# Patient Record
(demographics unavailable — no encounter records)

---

## 2025-04-22 NOTE — DISCUSSION/SUMMARY
[FreeTextEntry1] : TIME SPENT EXPLAINING TO MOM WHY HE SEEMS TO BE GETTING FREQ EAR INFECTIONS AND NASAL CONGESTION.( LATTER LEADS TO FORMER ). WE CAN TRY TO 10 USE DYMISTA TWICE A DAY  2) CLARITIN 5 ml QHS  3 ) CONSUME A DIET RICH IN VEGGIES AND PROTEIN AND NOT SO IN DAIRY PRODUCTS 4) AS MOM WANTED SOME RECOMMENDATIONS FOR NATURAL ALTERNATIVES WILL SUGGEST SHE GIVE HIM d hIST jR BY [employer] 5) ANTIBIOTICS IF HE IS FEBRILE OR CRYING WITH EAR PAIN AGAIN , OTHERWISE NOT BUT A FOLLOW UP IS A MUST TIME SPENT IN COUNSELLING AND COORDINATING CARE WAS 45 MINUTES

## 2025-04-22 NOTE — COUNSELING
[Use of Plain Language] : use of plain language [Needs Reinforcement, Provided] : needs reinforcement, provided [None] : none [] : I have reviewed management goals with caretaker and provided a copy of care plan

## 2025-04-22 NOTE — PHYSICAL EXAM
[Allergic Shiners] : allergic shiners [Discharge in canal] : discharge in canal [Bulging] : bulging [Purulent Effusion] : purulent effusion [Perforated] : perforated [Pale Nasal Mucosa] : pale nasal mucosa [Mucoid Discharge] : mucoid discharge [Hypertrophied Nasal Mucosa] : hypertrophied nasal mucosa [Cobblestoning] : cobblestoning of posterior pharynx [NL] : warm, clear

## 2025-04-22 NOTE — HISTORY OF PRESENT ILLNESS
[FreeTextEntry6] : PATIENT HAS BEEN UNWELL OVER THE WEEKEND  CONGESTED AND COUGHING LAST NIGHT CRYING WITH EAR PAIN AND THIS MORNING MOM NOTED THE OEAR DISCHARGE NO FEVER SLEEP DISTURBED